# Patient Record
Sex: MALE | Race: WHITE
[De-identification: names, ages, dates, MRNs, and addresses within clinical notes are randomized per-mention and may not be internally consistent; named-entity substitution may affect disease eponyms.]

---

## 2021-10-13 ENCOUNTER — HOSPITAL ENCOUNTER (OUTPATIENT)
Dept: HOSPITAL 53 - M SDC | Age: 41
Discharge: HOME | End: 2021-10-13
Attending: SURGERY
Payer: COMMERCIAL

## 2021-10-13 VITALS — HEIGHT: 70 IN | WEIGHT: 197 LBS | BODY MASS INDEX: 28.2 KG/M2

## 2021-10-13 VITALS — SYSTOLIC BLOOD PRESSURE: 121 MMHG | DIASTOLIC BLOOD PRESSURE: 77 MMHG

## 2021-10-13 DIAGNOSIS — N13.8: ICD-10-CM

## 2021-10-13 DIAGNOSIS — Z91.030: ICD-10-CM

## 2021-10-13 DIAGNOSIS — Z79.899: ICD-10-CM

## 2021-10-13 DIAGNOSIS — F17.210: ICD-10-CM

## 2021-10-13 DIAGNOSIS — K42.0: ICD-10-CM

## 2021-10-13 DIAGNOSIS — E11.9: ICD-10-CM

## 2021-10-13 DIAGNOSIS — D17.6: ICD-10-CM

## 2021-10-13 DIAGNOSIS — K41.90: ICD-10-CM

## 2021-10-13 DIAGNOSIS — F41.9: ICD-10-CM

## 2021-10-13 DIAGNOSIS — K40.30: Primary | ICD-10-CM

## 2021-10-13 DIAGNOSIS — Z79.84: ICD-10-CM

## 2021-10-13 PROCEDURE — 49650 LAP ING HERNIA REPAIR INIT: CPT

## 2021-10-17 NOTE — ROOPDOC
[facility] Report Of Operation


Report of Operation


DATE OF PROCEDURE: 10/13/21





PREPROCEDURE DIAGNOSES: right inguinal hernia, umbilical hernia.





POSTPROCEDURE DIAGNOSES: Right direct inguinal hernia, Cord Lipoma, Umbilical 

Hernia.





PROCEDURE PERFORMED: Robotic Assisted Transabdominal Preperitoneal Repair of 

Right inguinal hernia, umbilical hernia. 





SURGEON: Manpreet Joshi MD





ASSISTANT: Darline Villegas, NP





Ms. Villegas assisted me with placement of ports, management of the instruments 

and robotic arms on the field while I was at the surgeon's console including 

exchange of instruments, placement of mesh and sutures and the removal, closing 

of the port sites





ANESTHESIA: General endotracheal anesthesia.





ESTIMATED BLOOD LOSS: Approximately 20 mL. 





COMPLICATIONS: None. 





REMARKS: 41-year-old male with a right groin hernia that is symptomatic as well 

as an umbilical hernia.  He is brought in today for repair of both hernias.





FINDINGS: Roughly about a 3 cm direct hernia defect.  No peritoneal defect at 

the internal ring but he did have cord lipoma of moderate sized to was reduced 

back into the preperitoneal space from the inguinal canal.  2 x 1.5 cm umbilical

fascial defect containing preperitoneal fat tissue.





PROCEDURE NOTE: A 15 x 10 cm large progrip mesh placed preperitoneal he to cover

the inguinal defect and a 10 x 10 cm Bard soft mid weight bear polypropylene 

mesh placed to cover the umbilical fascial defect after closing the defect.





DESCRIPTION OF PROCEDURE: 





Patient received 2 g of Ancef IV preoperatively for wound prophylaxis. Patient 

was brought to the operating room, placed supine on the operating table. 

Compression boots placed in both lower extremities for DVT prophylaxis. After 

adequate general anesthesia started, he was positioned on the table with both 

arms tucked.  His abdomen and groin/pelvic area then prepped and draped in the 

usual sterile fashion. We paused for a surgical timeout using both pre-incision 

safety checklist to verify correct patient, procedure site and additional 

clinical information prior to beginning the procedure











   Entry to the abdomen done through a small incision at the epigastric area 

slightly to the right of the midline just off the subcostal area. A Veress 

needle is inserted on a controlled fashion. CO2 insufflation started to pressure

15 mmHg. Using the same incision a 8 mm robotic trocar then placed under direct 

vision of laparoscope. The insertion site was inspected for injury and none was 

found. Patient was then positioned on a 10 degree Trendelenburg position.  I 

then placed 2 working ports wide apart over the left and right sides to be able 

to perform both the inguinal dissection as well as the umbilical dissection.  

The da Nahid robot tower was then positioned in place and the trocars docked to 

the robotic arms. I then unscrubbed and took control of the camera and the 

laparoscopic instruments at the surgeon's console. A Forced bipolar forceps with

bipolar cautery and A  laparoscopic scissor with unipolar cautery was used.





A bilateral transversus abdominis plane block was used using a mixture of 

Exparel, 20 mL 1 4% Marcaine and 20 mL normal saline.





Operative Findings:  On diagnostic laparoscopy, about a 3 cm opening noted over 

the direct hernia space on the right side; I did not see any dimpling of the 

peritoneum into the internal ring.  On later dissection, a bulky cord lipoma is 

reduced back into the preperitoneal space from the inguinal canal.  Incarcerated

preperitoneal tissue noted pushing the umbilical cleft outward.    No noticeable

defect or bulging over at the left side as noted.





   I started with the inguinal hernia.  The peritoneum was opened up about 7 cms

above the superior edge of the fascial defect of the inguinal hernia starting at

the medial umbilical ligament (divided) going laterally towards the level of the

anterior superior iliac spine.  I then started developing the peritoneal flap in

itially medially towards the space of Retzius and then laterally towards the 

space of Bogros.  The preperitoneal tissue that this within the direct hernia 

space was reduced.   Medially the dissection was extended to past the symphysis 

pubis and 2 cms inferior to the pubic tubercle.I then plicated the redundant and

attenuated transversalis fascia with a pursestring using a 2 OV lock without 

directly closing the defect avoid injury to the crossing nerves.  I then 

completed the lateral dissection of the space of Bogros.  Coming up to the 

peritoneal flap on top of the testicular vessels, and vas deferens, this was 

dissected away from the structures leaving the parietal covering of both 

structures intact.  The vas deferens was paralyzed and the remaining fatty and 

fibrous attachments between the 2 spaces were divided.  The peritoneal flap was 

then continued inferiorly to accommodate the mesh.  The inguinal canal was then 

examined for cord lipoma and bulky cord lipoma was found and reduced back into 

the preperitoneal space.  Part of it was divided and removed. The dissection was

extended inferiorly past the femoral space. Laterally this was extended in the 

same plane and along the same depth as the space of Bogros.   I tested the flap 

to make sure there is adequate space for placement of mesh without lifting or 

clam shelling of the mesh. 





I chose a 15x10 cms Parietex Pro  self fixating mesh. This was folded with 

the center of the mesh marked for positioning. This was then delivered intra-

abdominally through one of the trochars. In a controlled fashion this was 

positioned into the preperitoneal space with the medial side towards the pubic 

tubercle past the symphysis pubis and the previously marked center of the mesh 

abutting the inferior epigastric vessels as it approaches the internal ring. The

mesh was then carefully unfolded and positioned in place with adequate overlap 

around the internal ring to cover the hernia defects including that of the 

identified small right femoral hernia.  This was carefully pressed onto the 

abdominal wall and made sure that it was flattened up.  I then used interrupted 

sutures of 2 OV lock at the symphysis pubis, Samson's ligament and at the rectus

muscle medially to make sure that the mesh does not invaginate to the direct 

hernia space. I again tested closing the flap making sure that the mesh remains 

intact without movement or clam shelling. The peritoneal flap was then closed 

with a running suture of 30V LOC starting laterally going medially with tacking 

of the extra hernia sac towards the umbilical ligament.





I then turned my attention to the umbilical hernia.  I had my assistant adjust 

the camera to allow for more proximal view . I then created my peritoneal flap 

opening up the peritoneum at the falciform ligament roughly about 5 or 6 cm 

superior to the umbilical, extending this both to the left and right of the 

midline.  I can then developing the flap as we approach the umbilical defect and

passed at to create the volcano sign.  The preperitoneal tissue within the 

umbilicus was then reduced.  The anterior attachments opened up and we proceeded

extending the peritoneal flap inferiorly and laterally in both sides to create 

an adequate pocket.  I used a ruler to measure my pocket as well as the 

umbilical fascial defect.  Umbilical fascial defect measures 2 x 1.5 cm oriented

more transversely.  There is minimal diastasis.  I then used a #0 strata fix to 

close the umbilical fascial defect while tacking the bottom off the umbilical 

skin to recreate the dimple in the umbilical cleft.  I doubled back with the 

suture to return it to the center for later on tacking the mesh.  I had my 

assistant got a 15 x 15 cm Bard soft mesh and trimmed this to 10 x 10 cm in a 

circular configuration.  This was placed intra-abdominally and I positioned this

to center on the umbilicus and tacked this with the strata fix.  I placed 

additional interrupted sutures to maintain the positioning of the mesh in 4 

corners using the 2-0 Vicryl.  I then had the pneumoperitoneum gradually lowered

to as low as 8 mmHg while closing the peritoneal flap using 3 OV lock.  The flap

was examined for holes.





I then surveyed the abdomen and pelvis for any signs of injury. Once satisfied I

scrubbed back in. The instruments were removed. The abdomen was deflated. All 

ports were removed. The skin incisions were closed with 4-0 Monocryl in 

subcuticular fashion. The incisions were covered with Dermabond. The port sites 

were again infiltrated 


with local anesthesia.











MANPREET JOSHI MD         Oct 17, 2021 20:43